# Patient Record
Sex: MALE | Race: BLACK OR AFRICAN AMERICAN | NOT HISPANIC OR LATINO | URBAN - METROPOLITAN AREA
[De-identification: names, ages, dates, MRNs, and addresses within clinical notes are randomized per-mention and may not be internally consistent; named-entity substitution may affect disease eponyms.]

---

## 2019-02-04 ENCOUNTER — IMPORTED ENCOUNTER (OUTPATIENT)
Dept: URBAN - METROPOLITAN AREA CLINIC 9 | Facility: CLINIC | Age: 54
End: 2019-02-04

## 2019-09-16 ENCOUNTER — IMPORTED ENCOUNTER (OUTPATIENT)
Dept: URBAN - METROPOLITAN AREA CLINIC 9 | Facility: CLINIC | Age: 54
End: 2019-09-16

## 2020-02-10 ENCOUNTER — IMPORTED ENCOUNTER (OUTPATIENT)
Dept: URBAN - METROPOLITAN AREA CLINIC 9 | Facility: CLINIC | Age: 55
End: 2020-02-10

## 2020-08-28 ENCOUNTER — IMPORTED ENCOUNTER (OUTPATIENT)
Dept: URBAN - METROPOLITAN AREA CLINIC 9 | Facility: CLINIC | Age: 55
End: 2020-08-28

## 2021-02-15 ENCOUNTER — IMPORTED ENCOUNTER (OUTPATIENT)
Dept: URBAN - METROPOLITAN AREA CLINIC 9 | Facility: CLINIC | Age: 56
End: 2021-02-15

## 2021-06-30 NOTE — PATIENT DISCUSSION
PT. [id number] [place] [employer] IN PHOROPTER WITH DR. Agnieszka Myeer TO PROCEED WITH TREATING ABOVE RX, OU

## 2021-06-30 NOTE — PATIENT DISCUSSION
New Prescription: Pred Forte (prednisolone acetate): drops,suspension: 1% 1 drop four times a day into both eyes 07-

## 2021-07-29 NOTE — PATIENT DISCUSSION
MYOPIA, OU- DISC OPT OF REFRACTIVE [id number]. PT UNDERSTANDS OPTIONS AND DESIRES TO PROCEED WITH LASIK TO REDUCE DEPENDENCY ON GLS/CTLS.

## 2021-07-29 NOTE — PATIENT DISCUSSION
Continue: Pred Forte (prednisolone acetate): drops,suspension: 1% 1 drop four times a day into both eyes 07-

## 2021-07-29 NOTE — PATIENT DISCUSSION
7/7 Pt is D+75. He is doing very well. Labs are looking great. He will begin to work on his D100 studies soon. He doesn't need any replacements today.  We will see him back on 8/4 It was discussed and explained that reading glasses will be needed for the correction of presbyopia after refractive surgery starting around the age of [de-identified].

## 2021-08-06 NOTE — PATIENT DISCUSSION
Stopped Today: Pred Forte (prednisolone acetate): drops,suspension: 1% 1 drop four times a day into both eyes 07-

## 2021-08-13 ENCOUNTER — IMPORTED ENCOUNTER (OUTPATIENT)
Dept: URBAN - METROPOLITAN AREA CLINIC 9 | Facility: CLINIC | Age: 56
End: 2021-08-13

## 2021-08-13 PROBLEM — H40.1131: Noted: 2021-08-13

## 2021-10-16 ASSESSMENT — KERATOMETRY
OS_K2POWER_DIOPTERS: 45.75
OS_AXISANGLE_DEGREES: 107
OD_K1POWER_DIOPTERS: 41.75
OS_AXISANGLE2_DEGREES: 21
OD_AXISANGLE2_DEGREES: 161
OS_K2POWER_DIOPTERS: 45.5
OS_AXISANGLE2_DEGREES: 17
OD_AXISANGLE2_DEGREES: 157
OD_K1POWER_DIOPTERS: 41.75
OS_AXISANGLE_DEGREES: 112
OD_AXISANGLE2_DEGREES: 164
OD_AXISANGLE_DEGREES: 71
OS_AXISANGLE2_DEGREES: 22
OD_K2POWER_DIOPTERS: 44.75
OS_K1POWER_DIOPTERS: 40.5
OD_K2POWER_DIOPTERS: 44.75
OD_K1POWER_DIOPTERS: 41.75
OD_AXISANGLE_DEGREES: 74
OD_K2POWER_DIOPTERS: 44.75
OS_AXISANGLE_DEGREES: 111
OD_AXISANGLE_DEGREES: 67
OS_K2POWER_DIOPTERS: 45.25
OS_K1POWER_DIOPTERS: 40
OS_K1POWER_DIOPTERS: 40.5

## 2021-10-16 ASSESSMENT — TONOMETRY
OS_IOP_MMHG: 14
OD_IOP_MMHG: 12
OS_IOP_MMHG: 15
OS_IOP_MMHG: 15
OD_IOP_MMHG: 15
OD_IOP_MMHG: 15
OD_IOP_MMHG: 17
OS_IOP_MMHG: 17
OD_IOP_MMHG: 12
OS_IOP_MMHG: 17
OS_IOP_MMHG: 17
OD_IOP_MMHG: 13

## 2021-10-16 ASSESSMENT — VISUAL ACUITY
OD_CC: 20/25 -2 SN
OS_CC: 20/30 -2 SN
OD_CC: 20/25 - SN
OS_CC: 20/40 SN
OD_CC: 20/40 - SN
OS_CC: 20/40 + SN
OS_CC: 20/50 SN
OD_CC: 20/25 -2 SN
OS_CC: 20/30 SN
OS_CC: 20/40 SN
OD_CC: 20/30 + SN
OS_CC: 20/50 -2 SN
OD_CC: 20/25 -2 SN
OS_CC: 20/30 SN
OD_CC: 20/20 - SN
OD_CC: 20/40 SN
OS_CC: 20/50 - SN
OD_CC: 20/20 - SN

## 2022-02-15 ENCOUNTER — ESTABLISHED PATIENT (OUTPATIENT)
Dept: URBAN - METROPOLITAN AREA CLINIC 4 | Facility: CLINIC | Age: 57
End: 2022-02-15

## 2022-02-15 DIAGNOSIS — H40.1131: ICD-10-CM

## 2022-02-15 DIAGNOSIS — H25.13: ICD-10-CM

## 2022-02-15 DIAGNOSIS — H11.153: ICD-10-CM

## 2022-02-15 DIAGNOSIS — E11.9: ICD-10-CM

## 2022-02-15 PROCEDURE — 92133 CPTRZD OPH DX IMG PST SGM ON: CPT

## 2022-02-15 PROCEDURE — 92015 DETERMINE REFRACTIVE STATE: CPT

## 2022-02-15 PROCEDURE — 92020 GONIOSCOPY: CPT

## 2022-02-15 PROCEDURE — 92014 COMPRE OPH EXAM EST PT 1/>: CPT

## 2022-02-15 ASSESSMENT — KERATOMETRY
OD_AXISANGLE_DEGREES: 064
OD_K2POWER_DIOPTERS: 44.50
OD_K1POWER_DIOPTERS: 42.00
OD_AXISANGLE2_DEGREES: 154
OS_AXISANGLE2_DEGREES: 17
OS_AXISANGLE_DEGREES: 107
OS_K1POWER_DIOPTERS: 40.75
OS_K2POWER_DIOPTERS: 45.50

## 2022-02-15 ASSESSMENT — TONOMETRY
OS_IOP_MMHG: 17
OD_IOP_MMHG: 15

## 2022-02-15 ASSESSMENT — VISUAL ACUITY
OS_GLARE: 20/100
OD_GLARE: 20/40
OD_CC: 20/20-2
OS_CC: 20/25-2
OU_CC: 20/20-2

## 2022-07-03 RX ORDER — LOSARTAN POTASSIUM 50 MG/1
TABLET ORAL
COMMUNITY

## 2022-07-03 RX ORDER — LATANOPROST 50 UG/ML
SOLUTION/ DROPS OPHTHALMIC
COMMUNITY

## 2022-07-03 RX ORDER — LOVASTATIN 20 MG/1
TABLET ORAL
COMMUNITY

## 2022-08-19 ENCOUNTER — DIAGNOSTICS ONLY (OUTPATIENT)
Dept: URBAN - METROPOLITAN AREA CLINIC 4 | Facility: CLINIC | Age: 57
End: 2022-08-19

## 2022-08-19 DIAGNOSIS — H40.1131: ICD-10-CM

## 2022-08-19 PROCEDURE — 92083 EXTENDED VISUAL FIELD XM: CPT

## 2022-08-19 PROCEDURE — 92250 FUNDUS PHOTOGRAPHY W/I&R: CPT

## 2024-03-11 ENCOUNTER — ESTABLISHED PATIENT (OUTPATIENT)
Facility: LOCATION | Age: 59
End: 2024-03-11

## 2024-03-11 DIAGNOSIS — H25.13: ICD-10-CM

## 2024-03-11 DIAGNOSIS — H40.1131: ICD-10-CM

## 2024-03-11 DIAGNOSIS — E11.9: ICD-10-CM

## 2024-03-11 DIAGNOSIS — H11.153: ICD-10-CM

## 2024-03-11 PROCEDURE — 92133 CPTRZD OPH DX IMG PST SGM ON: CPT

## 2024-03-11 PROCEDURE — 92014 COMPRE OPH EXAM EST PT 1/>: CPT

## 2024-03-11 PROCEDURE — 92015 DETERMINE REFRACTIVE STATE: CPT

## 2024-03-11 ASSESSMENT — KERATOMETRY
OS_K1POWER_DIOPTERS: 41.00
OD_K1POWER_DIOPTERS: 42.25
OS_AXISANGLE_DEGREES: 108
OD_AXISANGLE_DEGREES: 69
OD_K2POWER_DIOPTERS: 44.75
OS_K2POWER_DIOPTERS: 46.00
OS_AXISANGLE2_DEGREES: 18
OD_AXISANGLE2_DEGREES: 159

## 2024-03-11 ASSESSMENT — VISUAL ACUITY
OD_GLARE: 20/60
OD_CC: 20/25-2
OS_GLARE: 20/60
OS_CC: 20/30
OU_CC: 20/25-1

## 2024-03-11 ASSESSMENT — TONOMETRY
OS_IOP_MMHG: 16
OD_IOP_MMHG: 18

## 2024-09-13 ENCOUNTER — DIAGNOSTICS ONLY (OUTPATIENT)
Facility: LOCATION | Age: 59
End: 2024-09-13

## 2024-09-13 DIAGNOSIS — H40.1131: ICD-10-CM

## 2024-09-13 PROCEDURE — 92083 EXTENDED VISUAL FIELD XM: CPT

## 2025-03-13 ENCOUNTER — COMPREHENSIVE EXAM (OUTPATIENT)
Age: 60
End: 2025-03-13

## 2025-03-13 DIAGNOSIS — H11.153: ICD-10-CM

## 2025-03-13 DIAGNOSIS — E11.9: ICD-10-CM

## 2025-03-13 DIAGNOSIS — H25.13: ICD-10-CM

## 2025-03-13 DIAGNOSIS — H40.1131: ICD-10-CM

## 2025-03-13 PROCEDURE — 92014 COMPRE OPH EXAM EST PT 1/>: CPT

## 2025-03-13 PROCEDURE — 92015 DETERMINE REFRACTIVE STATE: CPT

## 2025-03-13 PROCEDURE — 92133 CPTRZD OPH DX IMG PST SGM ON: CPT
